# Patient Record
Sex: MALE | Race: WHITE | NOT HISPANIC OR LATINO | URBAN - NONMETROPOLITAN AREA
[De-identification: names, ages, dates, MRNs, and addresses within clinical notes are randomized per-mention and may not be internally consistent; named-entity substitution may affect disease eponyms.]

---

## 2017-04-21 NOTE — PATIENT DISCUSSION
Reassured that isolated subconjunctival hemorrhage does not correlate with intraocular bleeding or vision loss.

## 2020-06-08 ENCOUNTER — IMPORTED ENCOUNTER (OUTPATIENT)
Dept: URBAN - NONMETROPOLITAN AREA CLINIC 1 | Facility: CLINIC | Age: 25
End: 2020-06-08

## 2020-06-08 PROBLEM — H18.212: Noted: 2020-06-08

## 2020-06-08 PROCEDURE — 99203 OFFICE O/P NEW LOW 30 MIN: CPT

## 2020-06-08 NOTE — PATIENT DISCUSSION
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

## 2021-02-26 NOTE — PATIENT DISCUSSION
Patient discharged to home in stable condition. Written and verbal after care 
instructions given. Patient verbalizes understanding of instruction.IV removed. 
Catheter intact and site benign. Pressure and 4x4 applied to site. No bleeding 
noted. NO S/S OF DISTRESS NOTED UPON DISCHARGE Recommended weight and BMI control through healthy diet and exercise, green leafy veggies, UV protection, and not smoking. Reviewed the benefits of AREDS VITAMINS VERUS GENOTYPE directed vitamin therapy, and recommended following one or the other to try and prevent the progression of the disease. I advised if patient smokes or has ever smoked to avoid high doses of vitamin A (beta carotene) due to increased risk of lung cancer. Reviewed the importance of daily monitoring of the vision in each eye independently, along with the use of the [place]msler grid daily and instructed patient to call and return immediately for any new changes in their vision or on the Amsler grid. 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

## 2021-02-26 NOTE — PATIENT DISCUSSION
Reviewed that they tend to [address] over a few weeks, and then may not happen for many years. Patient instructed to return if they do not clear over a few weeks for further evaluation.

## 2022-04-09 ASSESSMENT — VISUAL ACUITY
OS_PH: 20/25+
OS_CC: 20/100
OD_PH: 20/25
OD_CC: 20/125

## 2022-04-09 ASSESSMENT — TONOMETRY: OD_IOP_MMHG: 13
